# Patient Record
Sex: MALE | Race: WHITE | NOT HISPANIC OR LATINO | URBAN - METROPOLITAN AREA
[De-identification: names, ages, dates, MRNs, and addresses within clinical notes are randomized per-mention and may not be internally consistent; named-entity substitution may affect disease eponyms.]

---

## 2024-07-02 ENCOUNTER — DOCUMENTATION ONLY (OUTPATIENT)
Dept: TRANSPLANT | Facility: CLINIC | Age: 29
End: 2024-07-02
Payer: COMMERCIAL

## 2024-07-02 ENCOUNTER — TELEPHONE (OUTPATIENT)
Dept: TRANSPLANT | Facility: CLINIC | Age: 29
End: 2024-07-02
Payer: COMMERCIAL

## 2024-07-02 NOTE — PROGRESS NOTES
Spoke with Deacon. Not sure where he got lab orders but not from us! Looks like his DASH showed Incomplete so all questions weren't answered.He will redo. Then we will contact him. Is in the service and would like to do labs at his base lab.

## 2024-07-02 NOTE — TELEPHONE ENCOUNTER
Please call Deacon;  He recently received order to have his blood drawn.  Deacon is in the [employer] in [place];  He is checking to see if he could get [place]?  Deacon's cell# [phone].   (He is being worked up to donate to his dad).

## 2024-07-03 ENCOUNTER — DOCUMENTATION ONLY (OUTPATIENT)
Dept: TRANSPLANT | Facility: CLINIC | Age: 29
End: 2024-07-03

## 2024-07-09 ENCOUNTER — TELEPHONE (OUTPATIENT)
Dept: TRANSPLANT | Facility: CLINIC | Age: 29
End: 2024-07-09
Payer: COMMERCIAL

## 2024-07-09 ENCOUNTER — DOCUMENTATION ONLY (OUTPATIENT)
Dept: TRANSPLANT | Facility: CLINIC | Age: 29
End: 2024-07-09
Payer: COMMERCIAL

## 2024-07-09 NOTE — TELEPHONE ENCOUNTER
DEIDRA attempted 3x to call Deacon on multiple phone numbers. Voicemail is not set up. Will need to be rescheduled.     Margarita Fernando [facility] HospitalSW, CCTSW   Independent Living Donor Advocate  [facility], [facility], [facility]  Direct: [phone]  E-Mail: [email or web address]

## 2024-07-09 NOTE — PROGRESS NOTES
DEIDRA appt resched for 7/10 at 1130 CST. States because he's on a  base calls can be spotty so said this will be a good time.Also mentioned possibly if he could have a phone # to call back in case he misses it.

## 2024-07-10 ENCOUNTER — TELEPHONE (OUTPATIENT)
Dept: TRANSPLANT | Facility: CLINIC | Age: 29
End: 2024-07-10
Payer: COMMERCIAL

## 2024-07-10 NOTE — TELEPHONE ENCOUNTER
Initial Independent Living Donor Advocate contact made with potential donor today.  I introduced myself and my role during the donation process, including:   DEIDRA ROLE   The federal government requires that all licensed transplant centers provide the living donor with an Independent Living Donor Advocate (DEIDRA).  I do not meet recipients or attend meetings that discuss their care or decision to transplant them. My role is separate to avoid any conflict of interest.  My role is to ensure:  1) your rights are protected;  2) you get all the information you need from the transplant team to make a fully informed decision whether to donate;   3) that living donation is in your best interest.   4) that you have the right to decide NOT to go forward with living donation at any time during this process.  I am available to you throughout the workup, during surgery phase and follow-up at home.     WORKUP & PRIVACY   Your identity and workup are not shared with the recipient at any time.   The recipient's insurance covers the medical expenses related to the donor evaluation and surgery.  However, it is important that you carry your own health insurance to address any medical issues that are found and are NOT related to living donation.  Additionally, age appropriate cancer screening (I.e. mammograms,  colonoscopies, etc) is required and would be through your insurance.  There is a psychosocial and medical donor workup that consists of testing to determine if you are healthy enough to donate. Workup tests include tissue typing/genetics, many blood draws, urine collection/ (kidney function testing), chest x-ray, EKG/other heart testing, CT scan. Age appropriate cancer screening is required and would be through your insurance. As you complete each step then you may move on to the next.  Workup can take as little or as long as you need and you can stop the process at any time. Transplant is a treatment option, not a cure. A kidney  from a living kidney donor can last 12-14 years.  Other treatment options are  donation and two types of dialysis.   This is major surgery and your estimated hospital stay is approximately 1-2 nights.  After surgery, there are driving and lifting restrictions - no driving for two weeks and no lifting over ten pounds for 8 weeks.  Donors are routinely off from work for 4 - 6 weeks after surgery, and potentially longer if they have a physical job.     If you anticipate lost wages due to donation, donor wage reimbursement options may be available to you and will be reviewed with you during the evaluation process. Donor Shield and NLDAC explained.  We reviewed the importance of completing follow-up labs and surveys at six months, 1 year and 2 years after donation to monitor kidney health and the impact donation has had on their life post donation.        QUESTIONS    Have you received the Welcome e-mail that includes copies of the informed consent, financial letter, information on donor shield and NLDAC from the transplant department? Yes.    Have you discussed with anyone your potential decision to donate?   Yes.    Is anyone pressuring or coercing you to donate? No.    Have you discussed any financial arrangements with recipient around donating a kidney? No.    Are you aware that you can confidentially opt out at any time, up to and including day of donation? Yes.    At this time, would you like to proceed with the medical evaluation to see if you can be a kidney donor?  Yes.    If yes, I will make an appointment for your donor coordinator to reach out to you with next steps.     Contact information for DEIDRA's was provided Yes.    Demographic Information:   Preferred Name: Deacon  Preferred Pronouns: he/him  Race/Ethnicity: White    Laly Walsh- [phone]  Margarita Fernando-  [phone]    Confirmed that Deacon Jiménez reviewed Informed consent document and all questions answered.  Reviewed that they will  receive Docusign to obtain electronic signature for the following: Informed consent, SRTR data, MICHAEL for medical information, Auth for Electronic communication, Kidney for Life and will need their signed consent back before proceeding with evaluation.     Time frame for donation: tbd  Paired exchange was introduced  Yes.      MyChart was initiated  Yes.  CareEverywhere was initiated Yes.    DEIDRA NOTES: Deacon would like to be evaluated as a donor for his father.  He is in the [employer] and currently based in [place].  Would be interested in NLDAC and I will ask SW to check into income of recipient. Appt with Jayleen scheduled for Monday at 1:45 PM      Duration of call 40 minutes

## 2024-07-11 ENCOUNTER — DOCUMENTATION ONLY (OUTPATIENT)
Dept: TRANSPLANT | Facility: CLINIC | Age: 29
End: 2024-07-11
Payer: COMMERCIAL

## 2024-07-15 ENCOUNTER — TELEPHONE (OUTPATIENT)
Dept: TRANSPLANT | Facility: CLINIC | Age: 29
End: 2024-07-15
Payer: COMMERCIAL

## 2024-07-15 DIAGNOSIS — Z00.5 TRANSPLANT DONOR EVALUATION: Primary | ICD-10-CM

## 2024-07-15 NOTE — TELEPHONE ENCOUNTER
Contacted Deacon Jiménez to introduce myself and my role, review of medical/surgical/family history and next steps.     Deacon Jiménez  is aware He can stop donor evaluation at any time.    Regular blood donor? No Last blood donation date N/A (Notified donor to avoid blood donation at this time to get accurate blood counts if going through evaluation)     Deacon Jiménez is a 28 year old male  ABO A that would like to learn more about kidney donation to his Dad. Open to paired exchange: yes     Concerns from medical/surgical/family history: None  He is in the [employer] and currently based in [place].     Current medications and NSAID use: none    Allergies reviewed: yes    Legal issues w/ law enforcement: no    Reviewed any history of travel in endemic areas: [place], yes  Strongyloides- [place], [place] and [place].  Trypanosoma cruzi ([place])- [place]  West Nile Virus- [place], [place], [place], [place] and [place]. (Included in WAQAR testing prior to donation)    Per our Phase 1 algorithm, does meet criteria to do preliminary testing. Social work screening No.      Verified that potential donor was provided the informed consent DocuSign and they are comfortable moving forward to living donor evaluation.    Reviewed evaluation testing: Iohexol, Lab work, CXR, EKG, Provider visits and functions, CT Angiogram.     Reviewed operations of selection committee and angio review meetings and the need for multidisciplinary input. Post-donation requirements include post-op appointment with your surgeon at 2 weeks after surgery, 6 week, 6 month, 1 year and 2 year lab tests.     Reviewed NKR listing and transfer of care to KPD team if approved. Provided Deacon with NKR website to review.     Briefly went over options if approved of NDD and voucher donation.     Deacon would like to proceed with next steps: phase 1      Encouraged sign up for [email or web address] and reviewed importance of watching teaching videos  prior to evaluation.    Verified recipient status if not NDD.    Donor timeline: TBD     Will send orders to scheduling team to set up for phase 1 testing.

## 2024-07-15 NOTE — LETTER
July 15, 2024      PHYSICIAN ORDERS  DONOR PHASE 1 TESTING    DATE & TIME: July 15, 2024 1:37 PM    PATIENT NAME: Deacon Jiménez      :    1995         MR# [if applicable]: [id number]      DIAGNOSIS:  Potential kidney donor  ICD-10 CODE: Z 00.5    Clinic measurements:   1. Height and weight    2. 3 Blood Pressure readings done 15 minutes apart    Labs:  1. Microalbumin Quantitative Random Urine with creat ratio  2. Protein Random Urine with creat ratio  3. Urinalysis with Micro  4. Serum Creatinine  5. Hemoglobin A1C  6. Hemoglobin  7. ABO blood type (rh not requested and not reimbursable)       Judith Gibson MD FACS  Assistant Professor of Surgery  Director, Living Kidney Donor Program.                                                                                                     PLEASE FAX RESULTS -[phone], ATTN:  Valerie Arnold   If you have any questions, please call [phone] Option 3        REIMBURSEMENT INFORMATION FOR LIVING ORGAN DONORS    LIVING ORGAN DONOR: This form MUST accompany & remain attached to Orders &  given to Provider and/or Healthcare Facility Business Office    PROVIDER/FACILITY INSTRUCTIONS: By accepting to perform these services for living organ  donation, the provider/facility agrees to exclusively bill the [facility] instead of billing  the patient or any insurance provider and agrees to accept the reimbursement, as described below, as  payment in full for services rendered.    PROVIDER BILLING INSTRUCTIONS:  1. [facility] agrees to pay for all authorized testing ordered by our transplant  program that is related to living organ donation. The attached orders/tests are part of the donor  Evaluation.    2. Do not bill the donor or donor's insurance. Send an itemized invoice, claim or statement to:    [facility]  Transplant Finance/Donor Billing  [address] [place] [address], [address]  [id number]    3. Billing statements must include the patient first and last name, date of birth, the CPT procedure code  and date of service. Please bill service on the ORIGINAL UBO4 or 1500 with appropriate CPT/HCPCS  codes along with W-9 and send to the above address to insure timely reimbursement.    4. Claims should be submitted no later than six months from the date when services are rendered.  Claims denied for late submission should not be billed to the donor or their private insurance carrier.    5. [facility] will reimburse all charges at 100% of the Medicare Fee Schedule as  defined in the Code of Federal Regulations (CFR) 42, Chapter IV. This is to be considered payment  in full. [facility], the patient, and/or the patient's insurance are NOT to  be billed any balance, co-payment, or deductible, per Medicare regulations. **ATTN: Facility  providing services for attached/enclosed Living Donor Orders; If facility does NOT AGREE to  the reimbursement rate stated above, PLEASE DENY SERVICES & refer Donor/patient back to  their Critical access hospital Care Coordinator Transplant Center.    6. Patients are NOT to make any payments at the time of service.    Please forward this information to your billing department so that a donor account can be set up with  these instructions.    Should you have any questions, please contact the Donor Billing office at [phone],  Monday - Friday, 8:00 a.m. to 4:00 p.m.   Thank you for your assistance.

## 2024-07-25 ENCOUNTER — TELEPHONE (OUTPATIENT)
Dept: TRANSPLANT | Facility: CLINIC | Age: 29
End: 2024-07-25
Payer: COMMERCIAL

## 2024-07-25 NOTE — TELEPHONE ENCOUNTER
SW checked in with intended recipient, who does not meet income guidelines for NLDAC.    SW left vs for Deacon to discuss any questions or concerns he may have as it relates to travel on evaluation day.     FELIZ will continue to provide support as appropriate.      SYL Briones, [facility]  Living Donor   Phone: [phone]  Pager: [phone]  [email or web address]

## 2024-08-20 ENCOUNTER — DOCUMENTATION ONLY (OUTPATIENT)
Dept: TRANSPLANT | Facility: CLINIC | Age: 29
End: 2024-08-20
Payer: COMMERCIAL

## 2024-08-26 ENCOUNTER — DOCUMENTATION ONLY (OUTPATIENT)
Dept: TRANSPLANT | Facility: CLINIC | Age: 29
End: 2024-08-26
Payer: COMMERCIAL

## 2024-08-26 NOTE — PROGRESS NOTES
"Received this Email response from Deacon ortiz:if he's done P1's yet:    \"I have not got the labs done yet. I'm awaiting my commands approval still. I still will be donating, but I'm in a stand still sadly. I will keep you posted, thank you for checking up tho!\"    "

## 2024-09-04 ENCOUNTER — DOCUMENTATION ONLY (OUTPATIENT)
Dept: TRANSPLANT | Facility: CLINIC | Age: 29
End: 2024-09-04
Payer: COMMERCIAL

## 2024-09-04 NOTE — LETTER
September 4, 2024    Deacon Jiménez  [id number] [address]      To Whom it may Concern,    Deacon Jiménez would like to be tested as a possible kidney donor to his father. The testing goes in a couple different stages. We initially do testing (called Phase 1's) at this [place] clinic [place]. If those results look within normal range then we would want him to come to the [facility] for a full day of testing, which happen on a Thursday or Friday. That includes labs, urine sample, Iohexol, meeting with the whole Transplant team, CTA renal, EKG and CXR.  If we have no concerns with the testing/results we would then be scheduling surgery and as long has his recipient is ready.    Deacon would potentially be having a Laparoscopic Hand Assisted Kidney Donation. They Surgeon makes an incision above his belly button and he will also have a couple laparoscopic sites as well. Surgery is about 4 hours long. Deacon would be in the hospital 1-3 nights depending on recovery and would have a lifting restriction of no more than 10 lbs for 6-8 weeks. He would have a follow up visit with the Transplant Surgeon 1-2 weeks after donation. He needs to complete urine and blood samples to monitor his kidney function at 6 weeks, 6 months, 1 year and 2 years. He will continue to go to his PCP on an annual basis. There are no dietary restrictions after donation but he needs to be hydrated everyday after. He will be unable to take any NSAID's on a daily/weekly basis because chronic use can lead him to possibly kidney failure himself.      Please let me know if you have any further questions.     I can be reached via e-mail or phone.    Jayleen Owen RN Transplant Coordinator   [phone]  [email or web address]

## 2024-11-08 ENCOUNTER — TELEPHONE (OUTPATIENT)
Dept: TRANSPLANT | Facility: CLINIC | Age: 29
End: 2024-11-08
Payer: COMMERCIAL

## 2024-11-08 NOTE — TELEPHONE ENCOUNTER
Patient Call: General  Route to LPN    Reason for call: Pt called to speak to coordinator. Pt asks if his at-home test results have been received, and what his next steps are.    Call back needed? Yes    Return Call Needed  Same as documented in contacts section  When to return call?: Same day: Route High Priority

## 2024-11-25 ENCOUNTER — DOCUMENTATION ONLY (OUTPATIENT)
Dept: TRANSPLANT | Facility: CLINIC | Age: 29
End: 2024-11-25
Payer: COMMERCIAL